# Patient Record
Sex: MALE
[De-identification: names, ages, dates, MRNs, and addresses within clinical notes are randomized per-mention and may not be internally consistent; named-entity substitution may affect disease eponyms.]

---

## 2023-01-03 PROBLEM — Z00.00 ENCOUNTER FOR PREVENTIVE HEALTH EXAMINATION: Status: ACTIVE | Noted: 2023-01-03

## 2023-01-09 ENCOUNTER — APPOINTMENT (OUTPATIENT)
Dept: ORTHOPEDIC SURGERY | Facility: CLINIC | Age: 48
End: 2023-01-09
Payer: MEDICAID

## 2023-01-09 ENCOUNTER — TRANSCRIPTION ENCOUNTER (OUTPATIENT)
Age: 48
End: 2023-01-09

## 2023-01-09 VITALS — HEIGHT: 73 IN | BODY MASS INDEX: 24.52 KG/M2 | RESPIRATION RATE: 16 BRPM | WEIGHT: 185 LBS

## 2023-01-09 DIAGNOSIS — Z78.9 OTHER SPECIFIED HEALTH STATUS: ICD-10-CM

## 2023-01-09 DIAGNOSIS — M25.551 PAIN IN RIGHT HIP: ICD-10-CM

## 2023-01-09 DIAGNOSIS — M62.89 OTHER SPECIFIED DISORDERS OF MUSCLE: ICD-10-CM

## 2023-01-09 DIAGNOSIS — M21.70 UNEQUAL LIMB LENGTH (ACQUIRED), UNSPECIFIED SITE: ICD-10-CM

## 2023-01-09 DIAGNOSIS — G89.29 PAIN IN RIGHT HIP: ICD-10-CM

## 2023-01-09 PROCEDURE — 99203 OFFICE O/P NEW LOW 30 MIN: CPT

## 2023-01-09 PROCEDURE — 72190 X-RAY EXAM OF PELVIS: CPT | Mod: RT

## 2023-03-15 ENCOUNTER — APPOINTMENT (OUTPATIENT)
Dept: ORTHOPEDIC SURGERY | Facility: CLINIC | Age: 48
End: 2023-03-15
Payer: MEDICAID

## 2023-03-15 VITALS — RESPIRATION RATE: 16 BRPM | BODY MASS INDEX: 24.52 KG/M2 | HEIGHT: 73 IN | WEIGHT: 185 LBS

## 2023-03-15 PROCEDURE — 73030 X-RAY EXAM OF SHOULDER: CPT | Mod: RT

## 2023-03-15 PROCEDURE — 99214 OFFICE O/P EST MOD 30 MIN: CPT

## 2023-03-15 PROCEDURE — 72050 X-RAY EXAM NECK SPINE 4/5VWS: CPT

## 2023-03-15 RX ORDER — GABAPENTIN 300 MG/1
300 CAPSULE ORAL
Qty: 270 | Refills: 1 | Status: ACTIVE | COMMUNITY
Start: 2023-03-15 | End: 1900-01-01

## 2023-03-16 ENCOUNTER — APPOINTMENT (OUTPATIENT)
Dept: MRI IMAGING | Facility: CLINIC | Age: 48
End: 2023-03-16
Payer: MEDICAID

## 2023-03-16 ENCOUNTER — OUTPATIENT (OUTPATIENT)
Dept: OUTPATIENT SERVICES | Facility: HOSPITAL | Age: 48
LOS: 1 days | End: 2023-03-16

## 2023-03-16 ENCOUNTER — NON-APPOINTMENT (OUTPATIENT)
Age: 48
End: 2023-03-16

## 2023-03-16 PROCEDURE — 72141 MRI NECK SPINE W/O DYE: CPT | Mod: 26

## 2023-03-21 ENCOUNTER — APPOINTMENT (OUTPATIENT)
Dept: PHYSICAL MEDICINE AND REHAB | Facility: CLINIC | Age: 48
End: 2023-03-21
Payer: MEDICAID

## 2023-03-21 VITALS
BODY MASS INDEX: 24.52 KG/M2 | RESPIRATION RATE: 18 BRPM | HEART RATE: 65 BPM | DIASTOLIC BLOOD PRESSURE: 66 MMHG | HEIGHT: 73 IN | SYSTOLIC BLOOD PRESSURE: 108 MMHG | WEIGHT: 185 LBS
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
## 2023-03-21 DIAGNOSIS — S16.1XXD STRAIN OF MUSCLE, FASCIA AND TENDON AT NECK LEVEL, SUBSEQUENT ENCOUNTER: ICD-10-CM

## 2023-03-21 DIAGNOSIS — M47.22 OTHER SPONDYLOSIS WITH RADICULOPATHY, CERVICAL REGION: ICD-10-CM

## 2023-03-21 PROCEDURE — 99204 OFFICE O/P NEW MOD 45 MIN: CPT

## 2023-03-21 NOTE — CONSULT LETTER
[FreeTextEntry1] : Dear Dr. WOOD  \par \par I had the pleasure of evaluating your patient, ALYSSIA ASH .\par \par Thank you very much for allowing me to participate in the care of this patient. If you have any questions, please do not hesitate to contact me. \par \par Sincerely, \par Maria R Appiah MD \par \par ABPMR Board Certified in Physical Medicine and Rehabilitation\par Certified Fellow of AANEM (Neuromuscular and Electrodiagnostic Medicine)\par Subspecialty certified in Sports Medicine (ABPMR)\par \par  of Physical Medicine and Rehabilitation\par [facility] [employer] [facility]\par [facility]\par \par

## 2023-03-21 NOTE — HISTORY OF PRESENT ILLNESS
[FreeTextEntry1] : Mr. ALYSSIA ASH is a very pleasant 48 year male RHD  who is  seen for evaluation of R hand numbness mainly 4th and 5th digits that has been ongoing for 7 weeks. 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 There was no specific acute injury or inciting event. The pain is described as dull and tingling. The pain is rated as 0/10 during today's visit. The patient's symptoms are aggravated by activity and often worse overnight and in the morning. Symptoms have improved somewhat since starting gabapentin 300mg BID, which was prescribed also for pain in the right shoulder (recent MRI demonstrated moderate to severe right foraminal stenosis). He did not tolerate nocturnal elbow bracing. The patient denies any weakness or bowel/bladder dysfunction. The patient has no other complaints at this time.

## 2023-03-21 NOTE — DATA REVIEWED
[MRI] : MRI [FreeTextEntry1] : The below images were reviewed during today's visit.\par \par \par   MR Cervical Spine No Cont             Final\par \par No Documents Attached\par \par \par \par \par   EXAM: [id number] - MR SPINE CERVICAL  - ORDERED BY: LUPE WOOD\par \par \par PROCEDURE DATE:  03/16/2023\par \par \par \par INTERPRETATION:  Cervical pain and radiculopathy. Numbness and tingling down arm.\par \par \par Technique: MRI of the cervical spine was performed utilizing axial and sagittal  T1, axial and sagittal T2-weighted and axial gradient echo images as well as sagittal STIR images.\par \par There are no prior studies available for comparison.\par \par Findings: There is minimal anterolisthesis of C4 on C5 (1 to 2 mm). There is minimal retrolisthesis of C5 on C6 (1 to 2 mm). There is mild reversal of the cervical lordosis. The vertebral bodies are of normal height. There is moderate loss of disc height and T2 signal at the C4/C5 and C5/C6 disc spaces consistent with desiccation. There is a small Schmorl's nodes seen at the inferior endplate of C5.. The remaining intervertebral discs spaces are   within normal limits. Evaluation of the spinal cord demonstrates no evidence of abnormal signal changes.  The marrow signal is within normal limits.  The cervicomedullary junction is normal.\par \par Evaluation of the individual levels demonstrate at the C2/C3 and C3/C4 levels there is no evidence of a focal disc herniation or spinal cord compression.\par \par At the C4/C5 level there is a minimal disc bulge with a tiny central disc protrusion flattening the ventral thecal sac. There is bilateral uncovertebral and facet hypertrophy. There is moderate to severe right foraminal narrowing. The left neuroforamen is patent. There is no evidence of spinal cord compression.\par \par At the C5/C6 level there is a mild diffuse disc bulge with a tiny central disc protrusion and annular tear flattening the ventral spinal cord. There is bilateral uncovertebral and facet hypertrophy. There is moderate to severe right and moderate left foraminal narrowing. Constellation of findings is causing minimal spinal cord compression.\par \par At the remaining levels there is no evidence of a focal disc herniation. There is no evidence of spinal cord compression.\par \par \par \par Impression:\par \par Minimal anterolisthesis of C4 on C5.\par \par Minimal retrolisthesis of C5 on C6.\par \par Mild reversal of the cervical lordosis.\par \par Degenerative changes of the cervical spine most notable at C4/C5 and C5/C6.\par \par C4/C5  minimal disc bulge with a tiny central disc protrusion with moderate to severe right foraminal narrowing. C4/C5 no evidence of spinal cord compression.\par \par C5/C6 mild diffuse disc bulge with a tiny central disc protrusion and annular tear flattening the ventral spinal cord with moderate to severe right and moderate left foraminal narrowing. C5/C6 minimal spinal cord compression.\par \par \par \par \par \par \par \par \par

## 2023-03-21 NOTE — REASON FOR VISIT
[Initial Evaluation] : an initial evaluation [FreeTextEntry1] : ref by Dr Lili PADILLA hand numbness and shoulder aching

## 2023-03-21 NOTE — PHYSICAL EXAM
[FreeTextEntry1] : PHYSICAL EXAM : OBJECTIVE \par \par GENERAL : Awake, alert and oriented to time place and person \par HEAD : normocephalic and atraumatic \par NECK : supple ,no tracheal deviation ,no thyroid enlargement noted with swallowing\par EYES : sclera and conjunctiva normal no redness,intact extraocular movements \par ENT  : ears and nose normal in appearance -hearing adequate \par PULMONARY: effort normal. No respiratory distress. breathing regular. No wheezes \par LYMPH : No swelling in limbs, capillary return within normal range \par CVS : warm extremities,no palpitations,not short of breath, no visible jugular venous distention\par PSYCH : mood and affect normal ,good eye contact ,normal attention \par ABDOMEN : no visible distension , \par NEUROLOGICAL:cranial nerves intact,muscle tone normal,gait and balance safe except where noted below \par [place] negative\par DTRs intact\par +Tinels positive at right elbow\par MMT 5/5 throughout\par SKIN : warm and dry No rash detected over specific body areas examined \par MUSCULOSKELETAL: normal muscle bulk, no focal bony tenderness /posture normal except where specified below. +Mild ttp at right trapezius, periscapular musculature\par

## 2023-03-21 NOTE — ASSESSMENT
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 .\par \par  relative rest and avoidance of painful activity where possible \par  increasing activity as discussed \par  return for EMG \par Information given to patient about EMG and Nerve Conduction Study Examination including  planning, differential diagnosis to rule in /rule out ,duration of the test ,precautions (if patient on blood thinner.has bleeding disorder or  pace maker device etc -still possible to undergo with care), side effects(benign-limited to short term bruising and discomfort/pain)  \par The protocol of temp checks upon arrival ,disinfection procedure of waiting room and the lab explained- reassured. \par All questions answered. \par Patient instructed to book appointment upon conclusion of appointment\par \par Information sheet ' Answers to your Questions on EMG " forwarded to patient to read prior to testing, with further information about training,background and the procedure itself .\par \par

## 2023-03-29 ENCOUNTER — APPOINTMENT (OUTPATIENT)
Dept: PHYSICAL MEDICINE AND REHAB | Facility: CLINIC | Age: 48
End: 2023-03-29
Payer: MEDICAID

## 2023-03-29 DIAGNOSIS — G56.21 LESION OF ULNAR NERVE, RIGHT UPPER LIMB: ICD-10-CM

## 2023-03-29 DIAGNOSIS — R20.0 ANESTHESIA OF SKIN: ICD-10-CM
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
## 2023-03-29 DIAGNOSIS — S66.919A STRAIN OF UNSPECIFIED MUSCLE, FASCIA AND TENDON AT WRIST AND HAND LEVEL, UNSPECIFIED HAND, INITIAL ENCOUNTER: ICD-10-CM

## 2023-03-29 DIAGNOSIS — R20.2 PARESTHESIA OF SKIN: ICD-10-CM

## 2023-03-29 DIAGNOSIS — R20.2 ANESTHESIA OF SKIN: ICD-10-CM

## 2023-03-29 PROCEDURE — 95913 NRV CNDJ TEST 13/> STUDIES: CPT

## 2023-03-29 PROCEDURE — 95886 MUSC TEST DONE W/N TEST COMP: CPT

## 2023-03-29 NOTE — PROCEDURE
[de-identified] : EMG /NERVE CONDUCTION STUDY performed today without complication\par \par Tabulated data, wave forms , conclusions and recommendations are attached and in the procedure report. \par Please refer to the scanned study attached to this encounter\par \par Full history and focused clinical exam performed prior to the examination and documented in report\par

## 2023-05-04 ENCOUNTER — TRANSCRIPTION ENCOUNTER (OUTPATIENT)
Age: 48
End: 2023-05-04

## 2023-05-15 RX ORDER — GABAPENTIN 300 MG/1
300 CAPSULE ORAL
Qty: 90 | Refills: 2 | Status: ACTIVE | COMMUNITY
Start: 2023-05-15 | End: 1900-01-01

## 2023-06-15 ENCOUNTER — TRANSCRIPTION ENCOUNTER (OUTPATIENT)
Age: 48
End: 2023-06-15

## 2023-09-20 ENCOUNTER — RX RENEWAL (OUTPATIENT)
Age: 48
End: 2023-09-20

## 2023-09-20 RX ORDER — GABAPENTIN 300 MG/1
300 CAPSULE ORAL
Qty: 90 | Refills: 2 | Status: ACTIVE | COMMUNITY
Start: 2023-06-15 | End: 1900-01-01

## 2024-04-26 NOTE — REVIEW OF SYSTEMS
[Negative] : Heme/Lymph [Fever] : no fever [Chills] : no chills [Chest Pain] : no chest pain [Lower Ext Edema] : no lower extremity edema [Shortness Of Breath] : no shortness of breath [Abdominal Pain] : no abdominal pain [Muscle Weakness] : no muscle weakness [Headache] : no headaches [Dizziness] : no dizziness [Difficulty Walking] : no difficulty walking [FreeTextEntry9] : R shoulder and periscapular pain CONSTITUTIONAL: Well-appearing; well-nourished; in no apparent distress.   	HEAD: Normocephalic; atraumatic.   	EYES:  conjunctiva and sclera clear  	ENT: normal nose; no rhinorrhea; normal pharynx with no erythema or lesions.   	NECK: Supple; non-tender;   	CARDIOVASCULAR: Normal S1, S2; no murmurs, rubs, or gallops. Regular rate and rhythm.   	RESPIRATORY: Breathing easily; breath sounds clear and equal bilaterally; no wheezes, rhonchi, or rales.  	GI: Soft; non-distended; non-tender  	EXT: WYLIE x 4.   	SKIN: Normal for age and race; warm; dry; good turgor; no apparent lesions or rash.   	NEURO: A & O x 3; face symmetric; grossly unremarkable.   PSYCHOLOGICAL: The patient’s mood and manner are appropriate. [de-identified] : R 5th digit numbness and dull pain

## 2024-08-13 ENCOUNTER — TRANSCRIPTION ENCOUNTER (OUTPATIENT)
Age: 49
End: 2024-08-13

## 2024-08-13 RX ORDER — GABAPENTIN 300 MG/1
300 CAPSULE ORAL
Qty: 90 | Refills: 1 | Status: ACTIVE | COMMUNITY
Start: 2024-08-13 | End: 1900-01-01

## 2024-09-10 ENCOUNTER — RX RENEWAL (OUTPATIENT)
Age: 49
End: 2024-09-10